# Patient Record
Sex: FEMALE | Race: WHITE | NOT HISPANIC OR LATINO | URBAN - METROPOLITAN AREA
[De-identification: names, ages, dates, MRNs, and addresses within clinical notes are randomized per-mention and may not be internally consistent; named-entity substitution may affect disease eponyms.]

---

## 2022-06-23 ENCOUNTER — EMERGENCY (EMERGENCY)
Facility: HOSPITAL | Age: 57
LOS: 1 days | Discharge: HOME | End: 2022-06-23
Attending: EMERGENCY MEDICINE
Payer: COMMERCIAL

## 2022-06-23 VITALS
TEMPERATURE: 98 F | OXYGEN SATURATION: 96 % | DIASTOLIC BLOOD PRESSURE: 67 MMHG | WEIGHT: 115.08 LBS | RESPIRATION RATE: 98 BRPM | HEART RATE: 109 BPM | SYSTOLIC BLOOD PRESSURE: 122 MMHG

## 2022-06-23 VITALS — RESPIRATION RATE: 18 BRPM | HEART RATE: 82 BPM | OXYGEN SATURATION: 99 %

## 2022-06-23 PROCEDURE — 73502 X-RAY EXAM HIP UNI 2-3 VIEWS: CPT

## 2022-06-23 PROCEDURE — 73090 X-RAY EXAM OF FOREARM: CPT | Mod: 26,LT

## 2022-06-23 PROCEDURE — 73110 X-RAY EXAM OF WRIST: CPT | Mod: 26,RT

## 2022-06-23 PROCEDURE — 71120 X-RAY EXAM BREASTBONE 2/>VWS: CPT | Mod: 26

## 2022-06-23 PROCEDURE — 93010 ELECTROCARDIOGRAM REPORT: CPT

## 2022-06-23 PROCEDURE — 71045 X-RAY EXAM CHEST 1 VIEW: CPT | Mod: 26

## 2022-06-23 PROCEDURE — 73110 X-RAY EXAM OF WRIST: CPT

## 2022-06-23 PROCEDURE — 93005 ELECTROCARDIOGRAM TRACING: CPT

## 2022-06-23 PROCEDURE — 99284 EMERGENCY DEPT VISIT MOD MDM: CPT

## 2022-06-23 PROCEDURE — 73130 X-RAY EXAM OF HAND: CPT | Mod: 26,RT

## 2022-06-23 PROCEDURE — 73502 X-RAY EXAM HIP UNI 2-3 VIEWS: CPT | Mod: 26,LT

## 2022-06-23 PROCEDURE — 99284 EMERGENCY DEPT VISIT MOD MDM: CPT | Mod: 25

## 2022-06-23 PROCEDURE — 73090 X-RAY EXAM OF FOREARM: CPT

## 2022-06-23 PROCEDURE — 73130 X-RAY EXAM OF HAND: CPT

## 2022-06-23 PROCEDURE — 71120 X-RAY EXAM BREASTBONE 2/>VWS: CPT

## 2022-06-23 PROCEDURE — 96372 THER/PROPH/DIAG INJ SC/IM: CPT

## 2022-06-23 PROCEDURE — 71045 X-RAY EXAM CHEST 1 VIEW: CPT

## 2022-06-23 RX ORDER — KETOROLAC TROMETHAMINE 30 MG/ML
15 SYRINGE (ML) INJECTION ONCE
Refills: 0 | Status: DISCONTINUED | OUTPATIENT
Start: 2022-06-23 | End: 2022-06-23

## 2022-06-23 RX ORDER — ACETAMINOPHEN 500 MG
650 TABLET ORAL ONCE
Refills: 0 | Status: COMPLETED | OUTPATIENT
Start: 2022-06-23 | End: 2022-06-23

## 2022-06-23 RX ADMIN — Medication 15 MILLIGRAM(S): at 20:20

## 2022-06-23 RX ADMIN — Medication 15 MILLIGRAM(S): at 20:49

## 2022-06-23 RX ADMIN — Medication 650 MILLIGRAM(S): at 20:49

## 2022-06-23 RX ADMIN — Medication 650 MILLIGRAM(S): at 20:19
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
## 2022-06-23 NOTE — ED PROVIDER NOTE - PATIENT PORTAL LINK FT
You can access the FollowMyHealth Patient Portal offered by [facility] by registering at the following website: [email or web address]. By joining [employer]’s FollowMyHealth portal, you will also be able to view your health information using other applications (apps) compatible with our system.
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
## 2022-06-23 NOTE — ED PROVIDER NOTE - CARE PROVIDER_API CALL
Shady Garcia)  Orthopaedic Surgery  [address] [place] [address]  Phone: [phone]  Fax: [phone]  Follow Up Time:
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
## 2022-06-23 NOTE — ED PROVIDER NOTE - PHYSICAL EXAMINATION
3 cm abrasion to left anterior lateral hip   no palpable or visualized deformities.   left hip is not externally rotated and not shortened   pt can hold up left leg  ambulating with limp  ttp over right 5th mcp Gen: No acute distress, non toxic. Pt sitting comfortably in the bed, talking with her niece.   Head: NCAT. No scalp hematomas.   Eyes: Mucous membranes moist, pink conjunctivae, EOMI without pain. Pupils equal and reactive b/l. No periorbital ecchymosis b/l.   Ears: TM's intact, gray with good light reflex b/l. TM's with no erythema, exudate, effusion, or bulging. No hemotympanum b/l. No postauricular ecchymosis.   Nose: Patent without congestion or epistaxis. No nasal flaring.   Throat: Patent, uvula midline. Posterior pharynx without blood, tonsillar swelling, erythema or exudate. Moist mucous membranes. No Stridor.   Neck: No cervical LAD. No neck stiffness. Full ROM of neck. neck supple.   CV: RRR, nl s1/s2.   Resp: CTAB, normal rate and effort. No wheezes, rhonchi, or crackles. No nasal flaring, retractions, or accessory muscle use. No signs of respiratory distress. minimally ttp over the midsternal area. No palpable or visualized deformities.   GI: Abdomen soft, nondistended. +Bowel sounds. Nontender to palpation to all 4 quadrants. No rebound tenderness, no guarding. No ecchymosis to abdomen.   Neuro: A&O x4, moving all 4 extremities. Normal muscle bulk and tone, 5/5 muscle strength on upper and lower extremities b/l. ambulating with limp. Sensation intact and symmetrical on face, upper and lower extremities b/l. No focal neuro deficits. Neurovascularly intact.  MSK: No mid-line spinal or paraspinal ttp. No palpable deformities. No snuffbox tenderness b/l. ttp over right 5th mcp. ttp over the dorsal surface of the distal ulna. No ttp to the shoulders b/l, elbows b/l, or left wrist/digits. Full ROM, active and passive, of shoulders, elbows, wrist and digits b/l. Minimal pain with flexion of digits of right hand in the 5th MCP.   ttp over the left anterior lateral hip area. left hip is not externally rotated and not shortened. pt can hold up left leg. Full ROM of lower extremity b/l. Pain with left hip abduction in the lateral area of the left hip. No ttp over the right hip, knees, ankles and feet b/l. No laxity of knee's b/l. No visualized or palpable deformities.  Skin: 3 cm abrasion to left anterior lateral hip. No active bleeding. no surrounding erythema, warmth to touch. no evidence of cellulitic processes. Skin well perfused. Cap refill <2sec  Vascular: No calf ttp. Radial and dorsalis pedal pulses 2+ b/l.

## 2022-06-23 NOTE — ED ADULT NURSE NOTE - NSIMPLEMENTINTERV_GEN_ALL_ED
Implemented All Universal Safety Interventions:  [place] to call system. Call bell, personal items and telephone within reach. Instruct patient to call for assistance. Room bathroom lighting operational. Non-slip footwear when patient is off stretcher. Physically safe environment: no spills, clutter or unnecessary equipment. Stretcher in lowest position, wheels locked, appropriate side rails in place.

## 2022-06-23 NOTE — ED PROVIDER NOTE - NS ED ROS FT
Gen: denies fever, chills  Skin: denies rashes, laceration, bruising  HEENT: denies visual changes, ear pain, throat pain  Respiratory: denies SOB  Cardiovascular: denies chest pain, palpitations  GI: denies abdominal pain, n/v  : denies urgency, bowel/bladder incontinence  MSK: +left hip pain, +midsternal pain, +pain in the right 5th digit. denies knee pain, ankle pain, back pain, neck pain  Neuro: denies headache, dizziness, weakness, numbness
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
